# Patient Record
Sex: FEMALE | Race: WHITE
[De-identification: names, ages, dates, MRNs, and addresses within clinical notes are randomized per-mention and may not be internally consistent; named-entity substitution may affect disease eponyms.]

---

## 2020-02-15 ENCOUNTER — HOSPITAL ENCOUNTER (INPATIENT)
Dept: HOSPITAL 35 - SBH | Age: 55
LOS: 11 days | Discharge: NURSING, REHAB OR LONG-TERM CARE | DRG: 57 | End: 2020-02-26
Attending: PSYCHIATRY & NEUROLOGY | Admitting: PSYCHIATRY & NEUROLOGY
Payer: COMMERCIAL

## 2020-02-15 VITALS — SYSTOLIC BLOOD PRESSURE: 177 MMHG | DIASTOLIC BLOOD PRESSURE: 94 MMHG

## 2020-02-15 VITALS — BODY MASS INDEX: 45.81 KG/M2 | WEIGHT: 291.9 LBS | HEIGHT: 67 IN

## 2020-02-15 DIAGNOSIS — Z80.8: ICD-10-CM

## 2020-02-15 DIAGNOSIS — E11.22: ICD-10-CM

## 2020-02-15 DIAGNOSIS — M54.9: ICD-10-CM

## 2020-02-15 DIAGNOSIS — E83.42: ICD-10-CM

## 2020-02-15 DIAGNOSIS — Z87.891: ICD-10-CM

## 2020-02-15 DIAGNOSIS — E66.01: ICD-10-CM

## 2020-02-15 DIAGNOSIS — F41.9: ICD-10-CM

## 2020-02-15 DIAGNOSIS — E53.8: ICD-10-CM

## 2020-02-15 DIAGNOSIS — Z79.82: ICD-10-CM

## 2020-02-15 DIAGNOSIS — F41.1: ICD-10-CM

## 2020-02-15 DIAGNOSIS — F15.10: ICD-10-CM

## 2020-02-15 DIAGNOSIS — Z91.5: ICD-10-CM

## 2020-02-15 DIAGNOSIS — G89.29: ICD-10-CM

## 2020-02-15 DIAGNOSIS — E78.5: ICD-10-CM

## 2020-02-15 DIAGNOSIS — Z82.3: ICD-10-CM

## 2020-02-15 DIAGNOSIS — I82.611: ICD-10-CM

## 2020-02-15 DIAGNOSIS — N18.3: ICD-10-CM

## 2020-02-15 DIAGNOSIS — K21.9: ICD-10-CM

## 2020-02-15 DIAGNOSIS — R19.7: ICD-10-CM

## 2020-02-15 DIAGNOSIS — J44.9: ICD-10-CM

## 2020-02-15 DIAGNOSIS — Z82.49: ICD-10-CM

## 2020-02-15 DIAGNOSIS — I50.9: ICD-10-CM

## 2020-02-15 DIAGNOSIS — F32.9: ICD-10-CM

## 2020-02-15 DIAGNOSIS — F14.10: ICD-10-CM

## 2020-02-15 DIAGNOSIS — G31.84: Primary | ICD-10-CM

## 2020-02-15 DIAGNOSIS — F60.9: ICD-10-CM

## 2020-02-15 DIAGNOSIS — I13.0: ICD-10-CM

## 2020-02-15 DIAGNOSIS — N17.9: ICD-10-CM

## 2020-02-15 DIAGNOSIS — Z79.899: ICD-10-CM

## 2020-02-15 PROCEDURE — 10880: CPT

## 2020-02-15 NOTE — D
[facility]
Valeri Mohan
[place]   [address]                     DISCHARGE SUMMARY             
_______________________________________________________________________________
 
Name:       BERNARDO MAKI                   Room #:         521B-B      DIS IN  
M.R.#:      [id number]                       Account #:      [id number]  
Admission:  02/15/20    Attend Phys:    Sushil Pichardo DO
Discharge:  02/26/20    Date of Birth:  [birth year]  
                                                          Report #: [id number]
                                                                    [id number]   
_______________________________________________________________________________
THIS REPORT FOR:  
 
cc:  ANGELITA - Family physician unknown
     FAM - Family physician unknown                                       
     Sushil Pichardo DO                                        ~
THIS REPORT FOR:   //name//                          
 
CC: Sushil GOLDSTEIN unknown
    Gianna Garcia
 
DATE OF SERVICE:  02/26/2020
 
 
INPATIENT PSYCHIATRIC DISCHARGE SUMMARY
 
ATTENDING PSYCHIATRIST:  Sushil Pichardo DO.
 
MEDICAL CONSULTANT:  Hira Holden MD
 
DISCHARGE DIAGNOSES:  Mild neurocognitive disorder; bipolar disorder per
history; currently exhibiting major depressive episode; generalized anxiety
disorder; stimulant use disorder, in sustained remission.
 
She is discharged on 1800-calorie diabetic diet as she has impaired fasting
glucose.  She is discharged to the [facility]. 
Psychiatric care to be continued by provider, Lorna, at [facility].  Primary care will be done by Dr. Doug Russell at [facility].  Discharge activity level as tolerated.  Encourage
participation in day program groups.
 
DISCHARGE MEDICATIONS:  Atorvastatin 20 mg p.o. at bedtime for hyperlipidemia,
amlodipine besylate 5 mg p.o. daily for hypertension, aspirin 81 mg oral daily
for cardioprotection, bupropion  mg p.o. daily for depression, trazodone
100 mg p.o. at bedtime p.r.n. for sleep,  Flonase 1 spray each nostril daily for
allergic rhinitis, magnesium oxide 400 mg p.o. b.i.d. for supplementation of
magnesium, polyethylene glycol 17 g dissolved in 8 ounces of water daily for
bowel motility, metformin hydrochloride 500 mg p.o. b.i.d. with meals for
impaired fasting glucose, folic acid 1 mg p.o. daily for supplementation,
vitamin D 5000 international units p.o. daily for deficiency.  Also, the patient
should use albuterol metered dose inhaler 8.5 grams 2 puffs inhaled q. 4-6 hours
p.r.n. for wheezing and the Pulmicort Flexhaler 1 inhalation twice a day for
COPD.
 
LABORATORY DATA:  Significant laboratories on this admission, her hemoglobin A1c
of 5.7, H and H on 02/25/2020 at 11.1 and 34.2, white count 5.8, platelet count
 
 
 
[address]   [address]                     DISCHARGE SUMMARY             
_______________________________________________________________________________
 
Name:       BERNARDO MAKI                   Room #:         521B-B      [facility] [place]  
[facility]#:      [id number]                       Account #:      [id number]  
Admission:  02/15/20    Attend Phys:    Sushil Pichardo, DO
Discharge:  02/26/20    Date of Birth:  [birth year]  
                                                          Report #: [id number]
                                                                    [id number]   
_______________________________________________________________________________
212.  Coags this admission, PT 10.6, INR 1.0.  D-dimer was 1.65.  The patient
did receive V/Q scan as well as venous Doppler ultrasound, which were negative
except for superficial right basilic vein thrombus which is too superficial to
anticoagulate for.  By the way, chest x-ray was done in conjunction with the V/Q
scan.  The only findings were no acute cardiopulmonary abnormality identified.
 
REASON FOR ADMISSION:  Back on 02/16/2020, the patient was transferred from [facility] where she was admitted after intentional overdose of metformin causing
hypoglycemia.  Her blood sugar was down to 40.  The patient reported she did not
take the overdose and then says that she did take an overdose, it was not
intentional.  The patient admitted she was feeling suicidal in the weeks leading
up to overdose, thinking about getting a gun as recently as 1 month ago. 
Reported feeling depressed.
 
HOSPITAL COURSE:  The patient was admitted to Geriatric Psychiatry Unit. 
Initially, the patient had fair participation in the milieu.  She would
typically sit in the lounge chair.  She could walk, but she was not a
particularly active patient.  She denied suicidal or homicidal ideation.  During
the course of the admission, I elected to have an overall simplification effort
of being on one antidepressant and one sleeper and she had been on several more
things prior to psychiatric admission.  In the last several days of the
admission, the patient was a bit avoidant of groups.  I questioned her pointedly
about this and it sounds like she got bored with things.  There was no evidence
of self-harm behavior or suicidal ideations on the last 3 days of the admission
and the patient will benefit from being in the RCF as well as going to day
programming at the nearby [facility].
 
PHYSICAL EXAMINATION:
VITAL SIGNS:  On the day of discharge, temperature 37.1, pulse 76, respirations
16, /60, O2 sat 91%.
MUSCULOSKELETAL:  Normal gait and station.
 
MENTAL STATUS EXAMINATION:  Well-developed, this is a super morbidly obese
 female appearing stated age.  Attention intact.  Concentration intact.
 Speech, soft, slow.  Thought process linear and goal directed.  Thought
content, relative poverty of thought.  No psychomotor agitation.  Some
psychomotor retardation.  Denied SI or HI.  Denied hopelessness and
helplessness.  Memory not formally tested.  Insight limited.  Judgment limited. 
Fund of knowledge, no greater than average.
 
The patient was seen during admission by Dr. Carlo Garber, neuropsychologist,
as there was concern of the dementia.  The patient certainly has risk factors
for one, but does not currently qualify for the diagnosis.  General Health
measures including weight loss as well as improving her overall mental health
will be an asset and I do feel that supportive placement is in the patient's
 
 
 
[facility]
[address]
[place]   [address]                     DISCHARGE SUMMARY             
_______________________________________________________________________________
 
Name:       BERNARDO MAKI                   Room #:         521B-B      DIS IN  
M.R.#:      [id number]                       Account #:      [id number]  
Admission:  02/15/20    Attend Phys:    Sushil Pichardo DO
Discharge:  02/26/20    Date of Birth:  [birth year]  
                                                          Report #: [id number]
                                                                    [id number]   
_______________________________________________________________________________
best interest given struggles over the last several years with just the
[facility] services from [facility].
 
 
 
 
 
 
 
 
 
 
 
 
 
 
 
 
 
 
 
 
 
 
 
 
 
 
 
 
 
 
 
 
 
 
 
 
 
 
 
 
 
 
                         
   By:                               
                   
D: 02/26/20 1928                           _____________________________________
T: 02/26/20 2044                           Sushil Pichardo,           /nt

## 2020-02-16 VITALS — SYSTOLIC BLOOD PRESSURE: 170 MMHG | DIASTOLIC BLOOD PRESSURE: 76 MMHG

## 2020-02-16 VITALS — DIASTOLIC BLOOD PRESSURE: 76 MMHG | SYSTOLIC BLOOD PRESSURE: 170 MMHG

## 2020-02-16 VITALS — SYSTOLIC BLOOD PRESSURE: 157 MMHG | DIASTOLIC BLOOD PRESSURE: 99 MMHG

## 2020-02-16 NOTE — NUR
Assumed care of patient at 11am. Patient in good spirits. Patient denies
hi/si. Patient denies pain. Patient calm and cooperative. Patients affect
blunted. Patient ambulates with out assistance. Patient takes medications
whole with fluids. Will continue to monitor.

## 2020-02-16 NOTE — NUR
ASSUMED CARE OF PATIENT AT 08:00. PATIENT SITTING IN DAY ROOM, RECEIVING
BREATHING TREATMENTS. PATIENT DENIES NEEDS OR CONCERNS AT THIS TIME. PATIENT
AMBULATES WITH STEADY GAIT. PATIENT WENT TO ROOM TO REST APPROX 10:50. REPORT
GIVEN TO RADHA SANDY AT 11:20.
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
## 2020-02-17 VITALS — SYSTOLIC BLOOD PRESSURE: 146 MMHG | DIASTOLIC BLOOD PRESSURE: 63 MMHG

## 2020-02-17 VITALS — DIASTOLIC BLOOD PRESSURE: 72 MMHG | SYSTOLIC BLOOD PRESSURE: 135 MMHG

## 2020-02-17 LAB
ANION GAP SERPL CALC-SCNC: 6 MMOL/L (ref 7–16)
BUN SERPL-MCNC: 9 MG/DL (ref 7–18)
CALCIUM SERPL-MCNC: 8.5 MG/DL (ref 8.5–10.1)
CHLORIDE SERPL-SCNC: 104 MMOL/L (ref 98–107)
CO2 SERPL-SCNC: 33 MMOL/L (ref 21–32)
CREAT SERPL-MCNC: 1.1 MG/DL (ref 0.6–1)
GLUCOSE SERPL-MCNC: 129 MG/DL (ref 74–106)
POTASSIUM SERPL-SCNC: 3.1 MMOL/L (ref 3.5–5.1)
SODIUM SERPL-SCNC: 143 MMOL/L (ref 136–145)

## 2020-02-17 NOTE — NUR
Assumed care on 02/16/20 @ 19:15, in bed awake alert and oriented X 4. Reports
bm yesterday 02/15 after taking MOM. Acucheck 126, 0 s/s insulin required.
Flat affect noted and sad countenance observed.  Cooperated with assessment.
Denies SI and HI.  Denies hallucinations. Reports anxiety regarding car needs
transmission repair and concerned about paying insurance.  Bed in low
position, up ad re, continent of b&b. Will continue to monitor q 12 minutes
for patient safety.

## 2020-02-17 NOTE — NUR
SW completed chart review and made a FX packet. IVAN will f/u with Della at [place] [phone]  for pt who wants to be included in d/c planning.

## 2020-02-17 NOTE — NUR
Assumed care of patient this am. Patient denies pain. Patient denies hi/si.
Patient slept 8.2 hours. Patients affect is blunted. Patient ambulates without
assistance. Patient takes medications whole with fluids. Patients assessment
shows clear breath sounds, diminished in the bases. Bowel sounds active. S1 S2
heard with auscultation.

## 2020-02-17 NOTE — NUR
Care assumed of patient at 1915: Patient seated in recliner, watching TV at
start of shift.  Patient presents with blunted, flat affect.  Appears
depressed and sad.  Reports depression as 5/10 and anxiety as 7/10.  Patient
not observed interacting with peers.  Isolating self to chair and withdrawn.
Patient did report that she was able to sleep well last night after receiving
Trazodone and requested same medication tonight.  Scheduled medication
provided as well as PRN Trazodone.  Patient ate 100% HS snack.  Discussed
coping mechanisms.  Patient smiled and said that she doesn't have any.
Discussed what patient enjoys to do and she stated "to watch TV".  Asked
patient to think of other activities out of her comfort zone that she would be
willing to try.  Patient alert and oriented x4.  Denies SI/HI/AH/VH.  No
delusional or paranoia behaviors observed.  Denies pain or discomfort.
Patient was able to ambulate to her room independently, complete HS ADL
independently and retire to bed at a reasonable hour.  Patient resting quietly
at this time.

## 2020-02-17 NOTE — NUR
IVAN spoke with Della Lambert at [facility] and she stated that while passive SI is
typical for thie pt, she was isolating for 2 weeks and believes this was a
legitimate suicide attempt. LYRIC stated that they have started a discussion
about placement at an F AL at d/c and will be working on this, as this pt is
not safe to return home. She stated that pt will often turned down services as
she does not want to feel like she is a burden but will accept assistance if
the caregiver reports that this is what we get paid to do.

## 2020-02-18 VITALS — DIASTOLIC BLOOD PRESSURE: 62 MMHG | SYSTOLIC BLOOD PRESSURE: 100 MMHG

## 2020-02-18 VITALS — SYSTOLIC BLOOD PRESSURE: 108 MMHG | DIASTOLIC BLOOD PRESSURE: 65 MMHG

## 2020-02-18 NOTE — NUR
Assess due to extreme class III obesity with BMI 46.  Admit to [facility] with
anxiety, cocaine substance abuse, SI attempt/OD.  Eating 100% of meals and BG
under control 133-167.  No specific food preferences stated.  Chart reviewed
and noted pt had reported 100 lb wt loss over past year some intentional,
other likely related lifestyle abuse, behaviors.  No longer has acute kidney
injury from OD; bun wnl, creatinine 1.1, K+ is low.  Recommend remove renal
diet restriction but continue carb control and [facility] altered chop diet.  Low
nutrition risk

## 2020-02-18 NOTE — NUR
Charity spoke with Della and confirmed that pt was willing to go to an AL or RCF.
Della will be calling back with names of placements and this worker will send
out the referrals.

## 2020-02-18 NOTE — NUR
Sitting in dining room without s/o distress.  Ambulates to room with slow,
steady gait.  Flat, sad affect with little spontaneous conversation but
compliant with questions.  Alert and orientated X 4.  When asked if she knows
why she is here she states that she got confused and took too much of her
medicine and got a kidney injury.  When asked about SI she states, "No."  and
states that she has no thoughts of harming herself but that if she "had a
heart attack and fell over that wouldn't be a bad thing."  When asked to
elaborate she repeated that she has no thoughts of harming herself.  Compliant
with meds and assessment.  States her back hurts 5/10 but does not want to
take any medication for it.  Offered Tylenol and she declined and stated she
would ask if it got worse.  Breath sounds clear t/o, bilaterally equal.  RT
med not documented, RT called at approximately 1000, states they are in the ER
and will get to it when they can.  Reg HR auscultated.  Color pink with brisk
capillary refill and palpable peripheral pulses.  Independent with voiding.
Active bowel sounds over large, rounded abdomen.  Healing red linear area
3-4mm and approximately 6 cm in length on skin in L flank area.  She states it
doesn't hurt and that she has been told it has been there since she has been
in the hospital.  She states she can't feel it and doesn't know where it came
from.  Requesting MOM, states she feels she has to have a stool but will need
help.  MOM given.

## 2020-02-18 NOTE — NUR
Sw met with Pt and she stated that she was feeling better but did not want to
return mateo and would prefer to live in an RCF. SW reported to pt that she was
talking to Della and that there was plans started to transisiton her there,
either from home or this admission. Pt understood and was grateful for the
update.

## 2020-02-19 VITALS — DIASTOLIC BLOOD PRESSURE: 75 MMHG | SYSTOLIC BLOOD PRESSURE: 149 MMHG

## 2020-02-19 VITALS — SYSTOLIC BLOOD PRESSURE: 100 MMHG | DIASTOLIC BLOOD PRESSURE: 62 MMHG

## 2020-02-19 VITALS — SYSTOLIC BLOOD PRESSURE: 144 MMHG | DIASTOLIC BLOOD PRESSURE: 76 MMHG

## 2020-02-19 LAB
ALBUMIN SERPL-MCNC: 3.1 G/DL (ref 3.4–5)
ALT SERPL-CCNC: 39 U/L (ref 30–65)
ANION GAP SERPL CALC-SCNC: 7 MMOL/L (ref 7–16)
AST SERPL-CCNC: 21 U/L (ref 15–37)
BASOPHILS NFR BLD AUTO: 0.6 % (ref 0–2)
BILIRUB SERPL-MCNC: 0.6 MG/DL
BUN SERPL-MCNC: 15 MG/DL (ref 7–18)
CALCIUM SERPL-MCNC: 8.8 MG/DL (ref 8.5–10.1)
CHLORIDE SERPL-SCNC: 102 MMOL/L (ref 98–107)
CO2 SERPL-SCNC: 33 MMOL/L (ref 21–32)
CREAT SERPL-MCNC: 1.2 MG/DL (ref 0.6–1)
EOSINOPHIL NFR BLD: 3.1 % (ref 0–3)
ERYTHROCYTE [DISTWIDTH] IN BLOOD BY AUTOMATED COUNT: 13.7 % (ref 10.5–14.5)
FOLATE SERPL-MCNC: 9.9 NG/ML (ref 8.6–58.9)
GLUCOSE SERPL-MCNC: 168 MG/DL (ref 74–106)
GRANULOCYTES NFR BLD MANUAL: 55.9 % (ref 36–66)
HCT VFR BLD CALC: 33.4 % (ref 37–47)
HGB BLD-MCNC: 10.9 GM/DL (ref 12–15)
LYMPHOCYTES NFR BLD AUTO: 32 % (ref 24–44)
MAGNESIUM SERPL-MCNC: 1.8 MG/DL (ref 1.8–2.4)
MCH RBC QN AUTO: 28.9 PG (ref 26–34)
MCHC RBC AUTO-ENTMCNC: 32.6 G/DL (ref 28–37)
MCV RBC: 88.5 FL (ref 80–100)
MONOCYTES NFR BLD: 8.4 % (ref 1–8)
NEUTROPHILS # BLD: 3.4 THOU/UL (ref 1.4–8.2)
PHOSPHATE SERPL-MCNC: 3.2 MG/DL (ref 2.5–4.9)
PLATELET # BLD: 257 THOU/UL (ref 150–400)
POTASSIUM SERPL-SCNC: 3.4 MMOL/L (ref 3.5–5.1)
PROT SERPL-MCNC: 7 G/DL (ref 6.4–8.2)
RBC # BLD AUTO: 3.77 MIL/UL (ref 4.2–5)
SODIUM SERPL-SCNC: 142 MMOL/L (ref 136–145)
VIT B12 SERPL-MCNC: 305 PG/ML (ref 193–986)
WBC # BLD AUTO: 6 THOU/UL (ref 4–11)

## 2020-02-19 NOTE — NUR
Assumed care of patient this am. Patient calm and pleasant. Patients affect is
flat. Patient denies hi/si. Patient denies pain. Patient ambulates without
assistance. Patient takes medications whole with fluids. Patients assesment
shows clear breath sounds, active bowel sounds, and s1 s2 heard with
auscultation. We will continue to monitor.

## 2020-02-19 NOTE — NUR
PATIENT UP IN RECLINER IN TV ROOM WHEN THIS NURSE CAME ON SHIFT AT 1900.
PATIENT WITH FLAT AFFECT BUT CALM AND COOPERATIVE. HER ASSESSMENT WAS WNL.
VSS. SHE DENIES PAIN. PATIENT DID HAVE AN US DONE OF BOTH BLE'S TONIGHT D/T
INCREASED D-DIMER TEST RESULTS. HEPARIN 5000U SQ STARTED THIS EVENING AND
GIVEN IN HER LEFT LOWER ABDOMEN. PATIENT HAD PUDDING FOR HS SNACK. SHE IS
CURRENTLY IN BED AND SLEEPING. CONTINUAL ROUNDS TO CHECK FOR SAFETY.

## 2020-02-19 NOTE — NUR
1932 RESUMMMED CARE FROM DAY SHIFT, PATIENT IN DAY ROOM IN A RECLINER SITTING
QUIET WATCHING TV. I ASKED PATIENT HOW HER DAY WAS, SHE STATES SHE DID NOT TRY
TO KILL HERSELF.  SHE STATES SHE DID NOT REALIZE SHE HAD TOOK EXTRA PILLS.
SHE DENIES SI/HI/AH/VH AT PRESENT, HER AFFECT IS FLAT SHE DENIES ANY PAIN. HER
BLOOD SUGARS HAVE BEEN WITHIN RANGE, SHE IS COOPERATIVE CALM.  PATIENT TOOK
MEDICATION WITHOUT INCIDENCE AND WENT TO BED AFTER TAKING MEDICATION.  WILL
CONTINUE TO MONITOR PATIENT FOR BEHAVIORS AND SAFETY.

## 2020-02-19 NOTE — NUR
REC removing renal diet as JERRELL resolved. Pt's K+ actually low at 3.1 mEq/L per
2/17 labs and renal diet will further restrict potassium. Recommend replacing
K+ and rechecking labs.

## 2020-02-20 VITALS — SYSTOLIC BLOOD PRESSURE: 155 MMHG | DIASTOLIC BLOOD PRESSURE: 85 MMHG

## 2020-02-20 VITALS — SYSTOLIC BLOOD PRESSURE: 96 MMHG | DIASTOLIC BLOOD PRESSURE: 58 MMHG

## 2020-02-20 VITALS — DIASTOLIC BLOOD PRESSURE: 58 MMHG | SYSTOLIC BLOOD PRESSURE: 96 MMHG

## 2020-02-20 NOTE — NUR
PATIENT HAD RESPIRATORY TREATMENT AROUND 2000. NO C/O OR SIGNS OF SOA OR
DISTRESS. PATIENT SLEEPING SOUNDLY NOW.

## 2020-02-20 NOTE — NUR
SW completed chart review and pt is showing insight into her impulses and
behaviors but does not feel safe to return to home alone. Pt is wanting and
willing to stay in an AL or RCF. Sw completed the SLUMS and pt scored a 18/30.
She really struggles with recall and reported this to Dr gordon. Pt will be
getting more neurotesting this week to rule out dementia and a major
neurocognitive disorder. IVAN will follow up with Della today and provide an
update.

## 2020-02-20 NOTE — NUR
PATIENT SLEEPING. PATIENT HAS DENIED SI, HI, AVH EARLIER THIS EVENING. PATIENT
STATES THAT SHE IS GOING TO GROUPS AND HOPING TO LEARN SOMETHING THAT SHE CAN
WORK ON TO IMPROVE HER LIFE. SHE STATES SHE DOESN'T TALK IN THE GROUPS BUT SHE
DOES LISTEN AND FINDS SOME OF THE THINGS HELPFUL. CONTINUING TO MONITOR.

## 2020-02-20 NOTE — NUR
FLAT AFFECT-DELAYED VERBAL RESPONSES-NAPPING IN DAYROOM DURING ANY
UNSTRUCTURED TIME-WILL ATTEND SCHEDULED ACTIVITIES BUT MINIMAL PARTICIPATION.
DURING 1;1 DENIES C/O PAIN DISCOMFORT. RESPONSES OR DELAYED-POOR EYE CONTACT.
EYES ARE CLOSED OR PARTIALLY CLOSED DURING INTERACTION. WITH MUCH PROMPTING
DID DISCLOSE HAS BEEN DEPRESSED FOR "A LONG TIME" AND "NOTHING REALLY HELPS".
HOPELESS/HELPLESS ATTITUDE DISPLAYED WHEN OFFERED SUGGESTIONS FOR SELF
CARE SHRUGS SHOULDERS IN AN APATHETIC MANNER. RATES DEPRESSIVE SYMPTOMS AS A 7
ON 1-10 SCALE. DENIES SI/SH/HI. PT TO HAVE LUNG SCAN LATER THIS PM-NUC MED
CONTACTED THIS RN WITH INSTRUCTIONS AND IV STARTED PER IV TEAM FOR PROCEDURE

## 2020-02-21 VITALS — DIASTOLIC BLOOD PRESSURE: 72 MMHG | SYSTOLIC BLOOD PRESSURE: 124 MMHG

## 2020-02-21 VITALS — SYSTOLIC BLOOD PRESSURE: 113 MMHG | DIASTOLIC BLOOD PRESSURE: 62 MMHG

## 2020-02-21 VITALS — DIASTOLIC BLOOD PRESSURE: 83 MMHG | SYSTOLIC BLOOD PRESSURE: 146 MMHG

## 2020-02-21 NOTE — NUR
Pt. sitting in recliner in day room at start of shift.  Pt. voiced no
complaints.  HS fsbs + 167 and 3 units of Humalog was given.  Heparin 5,000 IU
given to left lower abd. as ordered.  Pt.'s affect very flat and she was
quiet.  Pt. is up ad re without difficulty.  NO s/s distress noted.

## 2020-02-21 NOTE — NUR
Date of Admission: 02/15/20
Date of Activity Therapy Assessment: 02/18/20
Activity Goal: Leisure awareness, motivation
Initial Goal: 2 Group activities/day
Weekly progress towards goal: Did not achieve goals
Group participation level: Minimal
Behaviors observed: Patient will attend groups but is seen sitting in recliner,
legs reclined, eyes closed. When spoken to patient replies in a low voice/flat
affect. Often replies, "existing" or "here" when asked how she feels she is
progressing towards goals. Patient continues to find it difficult to identify
leisure interests more than Hindu music.
Plan: No change towards goal

## 2020-02-21 NOTE — NUR
FLAT AFFECT AND DELAYED VERBAL RESPONSES. DOES APPEAR DROWSY IN AM-AND REPORTS
FEELING LIKE "I HAVE A HANGOVER" CAN'T WAKE UP WHICH SHE ATTRIBUTES TO
TRAZADONE-DOES STATE AM DROWSINESS IS "BETTER THAN YESTERDAY" DENIES C/O PAIN
OR PHYSICAL DISCOMFORT. WHEN ASKED ABOUT SHORTNESS OF BREATH STATES "ITS A
LITTLE BETTER THAN USUAL" 02 SAT FOR AM VS 92 PERCENT-WHEN CHECKED LATER IN
SHIFT AT APPROX 1500 IS 93 PERCENT ON RA. AM BP MILDLY ELEVATED AT
146/83-RECHECK AFTER AM MEDICATIONS 124/76. DENIES SUICIDAL IDEATION SELF HARM
OR HOMICIDAL THOUGHTS. DOES REPORT MOOD "SAME AS ALWAYS" "I'M JUST HERE"
REPORTS SIGNIFICANT AND PERSISTANT ANHEDONIA AND LIMITED SUPPORT STATING "CASE
MANAGER" WAS HER ONLY SUPPORT SYSTEM. AMBULATES INDEPENDENTLY IN HALLWAY
WITHOUT ASSISTVE DEVICES AND GAIT IS STEADY. RATES DEPRESSIVE SYMPTOMS AS A
7 OUT OF 10 WHICH IS UNCHANGED FROM 2-20 AM MS EXAM. REFUSED SHOWER/CLOTHING
CHANGE WHEN OFFERED STATING "I WILL PROBABLY DO IT TOMORROW" WHEN ASKED TO
IDENTIFY 1 THINK SHE WAS INTERESTED IN OR ENJOYED STATES "I LIKE TO WATCH THE
NEWS SOMETIMES"

## 2020-02-21 NOTE — NUR
CHARITY called and left a VM with Della GUNTER and requested an update and possible
referral placements to be sent. D/C is likely next week once placement is
established. Charity will f/u later today.

## 2020-02-21 NOTE — NUR
Charity spoke with Della GUNTER and she stated that there might be placement at
[facility] [phone] [facility] and are considering a d/c early next
week. Charity spoke with pt about her finances and she understands that she won't
be able to keep up her car, but then she reported that the car broke down just
before her admission here and she was willing to lose her car. Sw reported
this to [facility] and they will get back to this worker asap with a decison.
they also mentioned that the RCF does not need a level II.

## 2020-02-22 VITALS — DIASTOLIC BLOOD PRESSURE: 51 MMHG | SYSTOLIC BLOOD PRESSURE: 100 MMHG

## 2020-02-22 VITALS — DIASTOLIC BLOOD PRESSURE: 69 MMHG | SYSTOLIC BLOOD PRESSURE: 147 MMHG

## 2020-02-22 VITALS — SYSTOLIC BLOOD PRESSURE: 147 MMHG | DIASTOLIC BLOOD PRESSURE: 69 MMHG

## 2020-02-22 NOTE — NUR
Care assumed of patient at 1915: Patient seated in dayroom at start of shift
in recliner.  Patient watching TV, isolating to self.  Patient presents with
blunted affect, appears sad, depressed.  Patient reports feeling anxious rated
5/10 due to wanting to go home.  However, denies feeling depressed.  Denies
pain or discomfort.  Patient alert and oriented x4.  Cooperative, quiet, only
answering simple, short responses.  Patient would not engage with nurse about
any type of topic brought up.  Patient alert, does not appear drowsy or
lethargic.  Patient took HS medication without difficulty.  Ate 100% HS snack.
 Not observed interacting with peers throughout the evening.  Denies
SI/HI/AH/VH.  No agitation or irritability observed.  Patient retired to bed
at a reasonable hour and has been resting quietly since.

## 2020-02-22 NOTE — NUR
Care assumed of patient at 1915: Patient resting in bed at start of shift.
Easily aroused.  Patient presents with flat affect, depressed mood.  Poor eye
contact made.  Answered questions in short, one word answers.  Denies
SI/HI/AH/VH.  No delusional or paranoia behaviors observed.  Patient denies
feeling depressed.  Reports she is anxious about not being able to go home.
Rates anxiety 5/10.  Patient denies pain or discomfort.  Cooperative with
assessment.  No irritability or agitation observed.  Patient asked the time,
which was provided.  Patient then visualized going to the dayroom.  Patient
politely asked for a snack which was provided.  Ate 100% HS snack.  Took HS
medication whole without difficulty.  Patient isolative, withdrawn.  Did not
converse with nurse or peers more than what was necessary.  Patient did report
having a bad day and not wanting to be around others.  Discussed that tomorrow
is a new day and a new start.  Discussed having the mind set that it will be a
great day despite being in the hospital.  Patient rather negative with each
coping mechanism discussed.  Patient resting quietly in bed at this time.

## 2020-02-22 NOTE — NUR
0715
Lying in bed sleeping without s/o distress.  Became irritated when lights were
turned on but was compliant with assessment.  Alert and orientated x4.  Denies
SI/HI.  No mention of pain.  Breath sounds clear t/o, bilaterally equal,
slightly diminished in lower lobes. Reg HR auscultated.  Color pink with brisk
capillary refill and palpable peripheral pulses.  Voiding independently.
Active bowel sounds over large, soft abdomen.  Ambulates to day room with
slow, steady gait.
 
0930
Sitting in recliner without s/o distress.  Flat affect, monosyballic responses
to questions.
 
1330
Becames irritated when asked to get up and walk around the unit a couple of
times.  Stated, "This is such bull shit!" and "I can't walk that far."
Explained why it was important to ambulate.  She got up and walked around unit
twice and then returned to the recliner where she is currently sleeping
without s/o distress.

## 2020-02-23 VITALS — SYSTOLIC BLOOD PRESSURE: 136 MMHG | DIASTOLIC BLOOD PRESSURE: 68 MMHG

## 2020-02-23 VITALS — DIASTOLIC BLOOD PRESSURE: 56 MMHG | SYSTOLIC BLOOD PRESSURE: 101 MMHG

## 2020-02-23 LAB
ALBUMIN SERPL-MCNC: 3.1 G/DL (ref 3.4–5)
ALT SERPL-CCNC: 34 U/L (ref 30–65)
ANION GAP SERPL CALC-SCNC: 6 MMOL/L (ref 7–16)
AST SERPL-CCNC: 13 U/L (ref 15–37)
BASOPHILS NFR BLD AUTO: 0.6 % (ref 0–2)
BILIRUB SERPL-MCNC: 0.4 MG/DL
BUN SERPL-MCNC: 15 MG/DL (ref 7–18)
CALCIUM SERPL-MCNC: 9.6 MG/DL (ref 8.5–10.1)
CHLORIDE SERPL-SCNC: 100 MMOL/L (ref 98–107)
CO2 SERPL-SCNC: 34 MMOL/L (ref 21–32)
CREAT SERPL-MCNC: 1.2 MG/DL (ref 0.6–1)
EOSINOPHIL NFR BLD: 3.4 % (ref 0–3)
ERYTHROCYTE [DISTWIDTH] IN BLOOD BY AUTOMATED COUNT: 13.7 % (ref 10.5–14.5)
GLUCOSE SERPL-MCNC: 242 MG/DL (ref 74–106)
GRANULOCYTES NFR BLD MANUAL: 58.4 % (ref 36–66)
HCT VFR BLD CALC: 34 % (ref 37–47)
HGB BLD-MCNC: 11 GM/DL (ref 12–15)
INR PPP: 1
LYMPHOCYTES NFR BLD AUTO: 29.2 % (ref 24–44)
MAGNESIUM SERPL-MCNC: 1.6 MG/DL (ref 1.8–2.4)
MCH RBC QN AUTO: 28.7 PG (ref 26–34)
MCHC RBC AUTO-ENTMCNC: 32.3 G/DL (ref 28–37)
MCV RBC: 88.9 FL (ref 80–100)
MONOCYTES NFR BLD: 8.4 % (ref 1–8)
NEUTROPHILS # BLD: 3.7 THOU/UL (ref 1.4–8.2)
PLATELET # BLD: 207 THOU/UL (ref 150–400)
POTASSIUM SERPL-SCNC: 3.5 MMOL/L (ref 3.5–5.1)
PROT SERPL-MCNC: 7 G/DL (ref 6.4–8.2)
PROTHROMBIN TIME: 10.6 SECONDS (ref 9.3–11.4)
RBC # BLD AUTO: 3.82 MIL/UL (ref 4.2–5)
SODIUM SERPL-SCNC: 140 MMOL/L (ref 136–145)
WBC # BLD AUTO: 6.4 THOU/UL (ref 4–11)

## 2020-02-23 NOTE — NUR
CONTINUES TO DISPLAY FLAT AFFECT-DELAYED VERBAL RESPONSES AND POOR EYE
CONTACT. WHEN ASKED ABOUT MOOD STATES "SAME" AND RATES DEPRESSIVE SYMPTOMS AS
A 7-8 ON 1-10 SCALE. CONTINUES TO DENY SI/SH/HI WHEN ASKED. GOOD APPETITE. DID
REPORT LOW BACK PAIN RATED AN 8 ON 1-10 SCALE-REPORTS THIS AS A LONG STANDING
PAIN-NOT ACUTE. TYLENOL 650MG PO PRN AT 1100 FOR ABOVE NOTED WITH MINIMAL
RELIEF-PT STATES UPON REASSESMENT THAT PAIN IS 6 ON 1-10 SCALE AND REFUSES PM
GROUP STATING FEELS MORE COMFORTABLE LYING THEN SITTING AS SITTING SEEMS TO
MAKE PAIN WORSE. DR WARNER CONTACTED AND 0 RECEIVED FOR LIDODERM PATCH FOR
ABOVE NOTED.

## 2020-02-24 VITALS — DIASTOLIC BLOOD PRESSURE: 66 MMHG | SYSTOLIC BLOOD PRESSURE: 152 MMHG

## 2020-02-24 VITALS — SYSTOLIC BLOOD PRESSURE: 152 MMHG | DIASTOLIC BLOOD PRESSURE: 66 MMHG

## 2020-02-24 VITALS — DIASTOLIC BLOOD PRESSURE: 49 MMHG | SYSTOLIC BLOOD PRESSURE: 116 MMHG

## 2020-02-24 NOTE — NUR
Assumed care of patient this pm shift. Patient sitting in chair in the dining
room watching tv and talking with patients. Patient states that she has low
back pain. Patient denies si/hi. Patients affect flat. Patient calm and
pleasant. Patient ambulates without assistance. patient takes medications
whole. Patients assessment shows clear breath sounds diminished in the bases,
active bowel sounds, and s1 s2 heard with auscultation.

## 2020-02-24 NOTE — NUR
Mj with [facility] came and visited pt. Pt seemed to like the facility. Mj
said they can accept pt when [facility] is available to transport; Mj said he
talked with [facility] before about picking pt up from hospital at discharge,
taking her home to get some items, and then taking her to the facility. He
said he is okay with a Wed or Thurs discharge.
 
SW team will continue to follow pt during her stay on this unit.

## 2020-02-24 NOTE — NUR
Care assumed of patient at 1915: Patient sleeping in bed at start of shift.
Patient easily aroused to voice.  Patient flat, depressed, sad.  Patient
answering all questions appropriately.  Providing short, one word answers.
Patient declined to get up for HS snack and socialization.  Isolated and
withdrawn to her bed this shift.  Odorous.  Offered shower and assist in
changing clothing.  Stated "tomorrow".  Difficulty making eye contact.
Patient did take HS medication whole without difficulty.  Patient declined
scheduled breathing treatment for RT.  Declined HS snack.  Attempted to
discuss coping mechanisms with patient and how her day was.  Patient simply
stated "I don't know".  Patient has been in bed since start of shift and
remains in bed sleeping at this time.

## 2020-02-24 NOTE — NUR
Assumed care at 0700.
Patient is calm and pleasant. Alert and oriented. Calm and pleasant. Vitals
are stable. Today she has been too quite. retracts back to the room after
meals or group therapy. Denies SI/HI, halucinations, and anxiety. Takes
medications as ordered. good appetite. will continue to monitor.

## 2020-02-24 NOTE — NUR
Charity called and left a VM letitia Camacho in admissions at [facility] [phone]
[phone]. Charity compelted chart review and sent updates including the [facility]UMS 15/30 amd
the [facility]. Charity reported iin the VM that pt was ready to d/c once they had a bed
available.

## 2020-02-25 VITALS — DIASTOLIC BLOOD PRESSURE: 72 MMHG | SYSTOLIC BLOOD PRESSURE: 123 MMHG

## 2020-02-25 VITALS — DIASTOLIC BLOOD PRESSURE: 54 MMHG | SYSTOLIC BLOOD PRESSURE: 116 MMHG

## 2020-02-25 LAB
ANION GAP SERPL CALC-SCNC: 3 MMOL/L (ref 7–16)
BUN SERPL-MCNC: 17 MG/DL (ref 7–18)
CALCIUM SERPL-MCNC: 8.9 MG/DL (ref 8.5–10.1)
CHLORIDE SERPL-SCNC: 101 MMOL/L (ref 98–107)
CO2 SERPL-SCNC: 36 MMOL/L (ref 21–32)
CREAT SERPL-MCNC: 1.2 MG/DL (ref 0.6–1)
ERYTHROCYTE [DISTWIDTH] IN BLOOD BY AUTOMATED COUNT: 13.7 % (ref 10.5–14.5)
GLUCOSE SERPL-MCNC: 133 MG/DL (ref 74–106)
HCT VFR BLD CALC: 34.2 % (ref 37–47)
HGB BLD-MCNC: 11.1 GM/DL (ref 12–15)
MAGNESIUM SERPL-MCNC: 1.8 MG/DL (ref 1.8–2.4)
MCH RBC QN AUTO: 28.7 PG (ref 26–34)
MCHC RBC AUTO-ENTMCNC: 32.5 G/DL (ref 28–37)
MCV RBC: 88.5 FL (ref 80–100)
PLATELET # BLD: 212 THOU/UL (ref 150–400)
POTASSIUM SERPL-SCNC: 3.9 MMOL/L (ref 3.5–5.1)
RBC # BLD AUTO: 3.86 MIL/UL (ref 4.2–5)
SODIUM SERPL-SCNC: 140 MMOL/L (ref 136–145)
WBC # BLD AUTO: 5.8 THOU/UL (ref 4–11)

## 2020-02-25 NOTE — NUR
Care assumed of patient at 1915: Patient was completing a shower at start of
shift.  Patient clean, tidy, new clothes worn.  Patient seated in dayroom
after shower.  Presents with flat, blunted affect.  However, while speaking
with patient, she did smile a couple times and was more interactive than
previous nights.  Patient appeared to be more open to conversation and
interaction.  Alert and oriented x4.  Denies SI/HI/AH/VH.  No agitation or
irritability observed.  Patient reports "some" anxiety about being discharged
in the morning.  States that she does not do well with change but is open to
new opportunities.  Patient worried about her car since she does not have a
place to store it and doubts that it will sell due to having a broken clutch.
Patient encouraged to talk to her  about concerns because there is
always a solution.  Patient reported lower back pain, provided PRN Tylenol,
reports medication as helpful.  Denies depression.  Patient ate 100% HS snack.
 Took HS medication whole without difficulty.  Reports being more hopeful of
her future than in past days.  Patient did report heartburn after retiring to
bed.  Order obtained for Mylanta 1x dose.  Medication administered.  At follow
up, patient sleeping quietly in bed.

## 2020-02-25 NOTE — NUR
CHARITY spoke with Della GUNTER and she will  pt on 2/27 at 10am and take her
to [facility]. Charity called and reported this to [facility] and then followed up
with pt. Charity made the d/c packet and FAX prepped. Charity reported this to nursing
and Dr gordon.

## 2020-02-25 NOTE — NUR
Nutrition: Weekly follow up. Pt ready to discharge this week, with discharge
set for 2/27 per latest  note. Discharging to residential care facility. RD
obtained provider permission to remove renal diet component last week on 2/21
as JERRELL resolved. K+ remains WNL; 3.5 mEq/L per 2/23. Eating essentially 100%
of all meals. Meal average= 94% x last 6 days. Fasting BG near normal this AM
at 139 mg/dl. If other daytime BGs found to be consistently > 180mg/dl, REC
adding 1500 kcal restriction to limit to 4 CHO choices/meal. On vitamin D,
folic acid supplementation. Recently completed 5 IM doses of B12 and ordered
on daily magnesium x10 days for low Mag. No new wt's to assess. Remains low
risk. Monitor BG trends and more add carb restrictions if become too elevated.

## 2020-02-25 NOTE — NUR
PATIENT CARE ASSUMED AT 1900. PATIENT ALERT AND ORIENTED BUT RELUCTANT TO
ENGAGE WITH STAFF. QUESTIONED ADDRESSED TO HER SHE ANSWERS WITH ONE WORD
ANSWERS. DOES NOT ENGAGE WITH PEERS OR STAFF UNLESS APPROACHED. AMBULATES
FREELY AND INDEPENDENTLY WITH NO PROBLEMS. APPEARS SOMEWHAT DISHOVELED.
EMERGES FROM ROOM FOR MEALS AND THEN RETURNS BACK TO BED INSULIN REQUIRED AT
NOON FOR BLOOD SUGAR  - TOLERATED WELL. PATIENT HAS NO EYE CONTACT WHEN
ADDRESSED. DETACHED AND DOES NOT WANT TO TALK. ASSESSMENT COMPLETION DIFFICULT
DUE TO RELUCTANCE TO EXPRESS FEELINGS - ENCOURAGED TO ATTEND GROUP IN
AFTERNOON- MUMBLED SOME VAGUE ANSWER. STATED SLEPT WELL WHEN QUESTIONED.
AFFECT VERY FLAT AND BLUNTED AND MOOD DETACHED - CURRENTLY IN ROOM SLEEPING
ONCE AGAIN.

## 2020-02-26 VITALS — DIASTOLIC BLOOD PRESSURE: 60 MMHG | SYSTOLIC BLOOD PRESSURE: 107 MMHG

## 2020-02-26 LAB
EST. AVERAGE GLUCOSE BLD GHB EST-MCNC: 117 MG/DL
GLYCOHEMOGLOBIN (HGB A1C): 5.7 % (ref 4.8–5.6)

## 2020-02-26 NOTE — NUR
PT LEFT WITH  AUTUMN IN W/C TO VEHICLE. PT DENIES ANY SI AT THIS
TIME. PT ATE BREAKFAST AND TOOK AM MEDS.

## 2020-02-26 NOTE — NUR
IVAN faxed the d/c orders and summary. IVAN also met with Della and she was very
complimentary to the care and communication during this pt sat inopt. She
stated that she will be sending all of her referrals to [facility].